# Patient Record
Sex: FEMALE | Race: BLACK OR AFRICAN AMERICAN | NOT HISPANIC OR LATINO | Employment: FULL TIME | URBAN - METROPOLITAN AREA
[De-identification: names, ages, dates, MRNs, and addresses within clinical notes are randomized per-mention and may not be internally consistent; named-entity substitution may affect disease eponyms.]

---

## 2020-05-04 ENCOUNTER — TELEPHONE (OUTPATIENT)
Dept: UROGYNECOLOGY | Facility: CLINIC | Age: 25
End: 2020-05-04

## 2020-05-04 NOTE — TELEPHONE ENCOUNTER
LVM for patient to reschedule for later in the afternoon Wed 5/6. She is not eligible for virtual visit, but if afternoon 5/6 does not work, she can reschedule to another day or other provider.

## 2020-05-06 ENCOUNTER — OFFICE VISIT (OUTPATIENT)
Dept: UROGYNECOLOGY | Facility: CLINIC | Age: 25
End: 2020-05-06
Payer: MEDICAID

## 2020-05-06 VITALS
SYSTOLIC BLOOD PRESSURE: 104 MMHG | WEIGHT: 257 LBS | HEIGHT: 65 IN | DIASTOLIC BLOOD PRESSURE: 76 MMHG | BODY MASS INDEX: 42.82 KG/M2

## 2020-05-06 DIAGNOSIS — N86 CERVICAL ECTROPION: ICD-10-CM

## 2020-05-06 DIAGNOSIS — Z23 NEED FOR HPV VACCINE: ICD-10-CM

## 2020-05-06 DIAGNOSIS — N93.0 PCB (POST COITAL BLEEDING): Primary | ICD-10-CM

## 2020-05-06 DIAGNOSIS — Z12.4 PAP SMEAR FOR CERVICAL CANCER SCREENING: ICD-10-CM

## 2020-05-06 PROCEDURE — 87481 CANDIDA DNA AMP PROBE: CPT | Mod: 59

## 2020-05-06 PROCEDURE — 88141 PR  CYTOPATH CERV/VAG INTERPRET: ICD-10-PCS | Mod: ,,, | Performed by: PATHOLOGY

## 2020-05-06 PROCEDURE — 88141 CYTOPATH C/V INTERPRET: CPT | Mod: ,,, | Performed by: PATHOLOGY

## 2020-05-06 PROCEDURE — 99999 PR PBB SHADOW E&M-EST. PATIENT-LVL III: CPT | Mod: PBBFAC,,, | Performed by: PHYSICIAN ASSISTANT

## 2020-05-06 PROCEDURE — 87661 TRICHOMONAS VAGINALIS AMPLIF: CPT

## 2020-05-06 PROCEDURE — 99999 PR PBB SHADOW E&M-EST. PATIENT-LVL III: ICD-10-PCS | Mod: PBBFAC,,, | Performed by: PHYSICIAN ASSISTANT

## 2020-05-06 PROCEDURE — 87491 CHLMYD TRACH DNA AMP PROBE: CPT

## 2020-05-06 PROCEDURE — 87801 DETECT AGNT MULT DNA AMPLI: CPT

## 2020-05-06 PROCEDURE — 99204 PR OFFICE/OUTPT VISIT, NEW, LEVL IV, 45-59 MIN: ICD-10-PCS | Mod: S$PBB,,, | Performed by: PHYSICIAN ASSISTANT

## 2020-05-06 PROCEDURE — 88175 CYTOPATH C/V AUTO FLUID REDO: CPT | Performed by: PATHOLOGY

## 2020-05-06 PROCEDURE — 99204 OFFICE O/P NEW MOD 45 MIN: CPT | Mod: S$PBB,,, | Performed by: PHYSICIAN ASSISTANT

## 2020-05-06 PROCEDURE — 99213 OFFICE O/P EST LOW 20 MIN: CPT | Mod: PBBFAC | Performed by: PHYSICIAN ASSISTANT

## 2020-05-06 RX ORDER — NORGESTIMATE AND ETHINYL ESTRADIOL 7DAYSX3 28
1 KIT ORAL DAILY
Qty: 30 TABLET | Refills: 11 | Status: SHIPPED | OUTPATIENT
Start: 2020-05-06 | End: 2021-05-06

## 2020-05-06 RX ORDER — PHENTERMINE HYDROCHLORIDE 37.5 MG/1
TABLET ORAL
COMMUNITY
Start: 2020-03-23

## 2020-05-06 NOTE — LETTER
May 6, 2020      Janak London MD  [address] [facility] [address]           [facility] UroGynecology-[id number]   [address] [address]  Phone: [phone]          Patient: Ambar Rascon   MR Number: [id number]   YOB: 1995   Date of Visit: 5/6/2020       Dear Dr. Janak London:    Thank you for referring Ambar Rascon to me for evaluation. Attached you will find relevant portions of my assessment and plan of care.    If you have questions, please do not hesitate to call me. I look forward to following Ambar Rascon along with you.    Sincerely,    Michael Teixeira PA-C    Enclosure  CC:  No Recipients    If you would like to receive this communication electronically, please contact [email or web address] or [phone] to request more information on [employer] access.    For providers and/or their staff who would like to refer a patient to [facility], please contact us through our one-stop-shop provider referral line, [facility], at [phone].    If you feel you have received this communication in error or would no longer like to receive these types of communications, please e-mail [email or web address]

## 2020-05-06 NOTE — PROGRESS NOTES
[facility] UROGYNECOLOGY-[id number]   [address] [address]    Ambar Rascon  [id number]  1995  May 6, 2020    Consulting Physician: Janak London MD   GYN: None  PCP: Janak London MD    CC:   Chief Complaint   Patient presents with    abnormal vaginal bleeding       HPI: 23 y/o  healthy AA female presents for abnormal vaginal bleeding x 3 weeks.    Patient says she has been bleeding for 3 weeks. She reports that she started her period on , stopped bleeding . She had intercourse the next day and her partner first noticed bright red blood coming from the vagina. Stopped intercourse. Thought it could have been from period, but in days after she continued bleeding and passing blood clots. It is not enough blood to wear a pad, but sees it usually when she wipes. Also experiencing cramping, back pain, mood swings, occasional upset stomach and more bleeding with bearing down during bowel movements. She is not presently bleeding, last time she saw blood was  when she went to the bathroom and wiped (saw it on her toilet paper). Denies dyspareunia or other vaginal pain, abnormal discharge, rectal pain or bleeding. No history of metrorrhagia, abnormal PAP, bleeding disorder, or connective tissue disorder. Denies trauma. She endorses chlamydia in the past. Was having very regular periods. Uses condoms sometimes for birth control. Is interested in OCP. Has one sexual partner.       Past Medical History  History reviewed. No pertinent past medical history.     Past Surgical History  Past Surgical History:   Procedure Laterality Date    TONSILLECTOMY          Family History  History reviewed. No pertinent family history.   Colon CA: No  Breast CA: No  GYN CA: No   CA: No    Social History  Social History     Tobacco Use   Smoking Status Never Smoker   Smokeless Tobacco Never Used     Social History     Substance and Sexual Activity   Alcohol Use Not on file   .   "  Social History     Substance and Sexual Activity   Drug Use Yes    Types: Marijuana   .  The patient is single. Has sex with men, one partner.   Resides in [place] [address].  Employment status: currently employed  [employer]    Allergies  Review of patient's allergies indicates:  No Known Allergies    Medications  Current Outpatient Medications on File Prior to Visit   Medication Sig Dispense Refill    phentermine (ADIPEX-P) 37.5 mg tablet        No current facility-administered medications on file prior to visit.        OB History    Para Term  AB Living   1         1   SAB TAB Ectopic Multiple Live Births                  # Outcome Date GA Lbr Jeffery/2nd Weight Sex Delivery Anes PTL Lv   1                 Past OB History  No obstetric history on file.   x 1.    Largest infant weight: 7 lb 13oz  no FAVD.   no episiotomy.      Gynecologic History  LMP: Patient's last menstrual period was 2020.  Method of contraception: sometimes condoms  Age of menarche: 14  Menstrual history: regular, heavy, changes pads every 2 hours, lasts for 5 days    Well Woman  Last pap:   Last mammogram: none  Colonoscopy: none  DEXA: none    Review of Systems A 14 point ROS was reviewed with pertinent positives as noted above in the history of present illness.      Constitutional: negative  Eyes: negative  Endocrine: negative  Gastrointestinal: negative  Cardiovascular: negative  Respiratory: negative  Allergic/Immunologic: negative  Integumentary: negative  Psychiatric: negative  Musculoskeletal: negative   Ear/Nose/Throat: negative  Neurologic: negative  Genitourinary: SEE HPI  Hematologic/Lymphatic: negative   Breast: negative    Urogynecologic Exam  /76 (BP Location: Right arm, Patient Position: Sitting)   Ht 5' 5" (1.651 m)   Wt 116.6 kg (257 lb)   LMP 2020   BMI 42.77 kg/m²     GENERAL APPEARANCE:  The patient is a well-developed, well-nourished overweight AA " female.  Abdomen:  Soft, nontender, nondistended, no hepatosplenomegaly.  Incisions:  None    PELVIC:    External genitalia:  Normal Bartholins, Skenes and labia bilaterally.    Urethra:  No caruncle, diverticulum or masses.  (--) hypermobility.    Vagina:  Atrophy (--) , no bladder masses or tender, no discharge.    Cervix:  thin prep PAP obtained, presence of blood from cervical os and ectropian, friable cervix  Uterus: normal size, contour, position, consistency, mobility, non-tender  Adnexa: Not palpable.    POP-Q: Deferred.  No obvious POP present with valsalva.     NEUROLOGIC:  Cranial nerves 2 through 12 intact.  Strength 5/5.  DTRs 2+ lower extremities.  S2 through 4 normal.  Sacral reflexes intact.    EXT: TORRES, 2+ pulses bilaterally, no C/C/E    RECTAL:    External:  Normal, (--) hemorrhoids, (--) dovetailing.   Internal:  deferred      Impression  1. PCB (post coital bleeding)        Initial Plan  The patient was counseled regarding these issues. She was given a summary sheet containing each of these issues with possible options for evaluation and management. We also reviewed computer-generated diagrams specific to her quantification findings and she was given a copy of this for her records.  All her questions were addressed to her satisfaction.    Cervical ectropion  -- Post-coital bleeding most likely physiologic due to cervical ectropion.   -- Cervical ectopy is when the soft cells (glandular cells) that line the inside of the cervical canal spread to the outer surface of your cervix. You may have been born with cervical ectropion. Or you may develop it later in life, most likely when your hormone levels change and estrogen levels go up, such as during puberty, pregnancy, or when you take birth control pills.  -- Pregnancy test negative  -- Rule out infectious cervicitis. Tested for G/C and trichomonas.   -- PAP done today, will result in 2-3 weeks.   -- No signs of rectal bleeding  --Encouraged patient  to refrain from vaginal intercourse for a week or two    Contraception  -- occasional condom use presently  -- would like to start oral birth control at this time  -- warned that Adipex can decrease effectiveness of oral birth control  -- consider IUD after resolution of cervical bleeding    HPV vaccine  -- [facility]    RTC in 1 month if bleeding has not resolved    Approximately 25 min were spent in consult, 80 % in discussion.     Thank you for requesting consultation of your patient.  I look forward to participating in her care.    Michael Teixeira PA-C  Division of Female Pelvic Medicine and Reconstructive Surgery  Department of Obstetrics and Gynecology  [facility] [place], [place]

## 2020-05-06 NOTE — PATIENT INSTRUCTIONS
Cervical ectropion  -- Post-coital bleeding most likely physiologic due to cervical ectropion.   -- Cervical ectopy is when the soft cells (glandular cells) that line the inside of the cervical canal spread to the outer surface of your cervix. You may have been born with cervical ectropion. Or you may develop it later in life, most likely when your hormone levels change and estrogen levels go up, such as during puberty, pregnancy, or when you take birth control pills.  -- Pregnancy test negative  -- Rule out infectious cervicitis. Tested for G/C and trichomonas.   -- PAP done today, will result in 2-3 weeks.   -- No signs of rectal bleeding  --Encouraged patient to refrain from vaginal intercourse for a week or two    Contraception  -- occasional condom use   -- would like to start oral birth control at this time  -- warned that Adipex can decrease effectiveness of oral birth control    HPV vaccine  -- referred to injection nurse at [facility] for vaccine    RTC in 1 month if bleeding has not resolved

## 2020-05-08 ENCOUNTER — TELEPHONE (OUTPATIENT)
Dept: UROGYNECOLOGY | Facility: CLINIC | Age: 25
End: 2020-05-08

## 2020-05-08 LAB
BACTERIAL VAGINOSIS DNA: NEGATIVE
C TRACH DNA SPEC QL NAA+PROBE: DETECTED
CANDIDA GLABRATA DNA: NEGATIVE
CANDIDA KRUSEI DNA: NEGATIVE
CANDIDA RRNA VAG QL PROBE: NEGATIVE
N GONORRHOEA DNA SPEC QL NAA+PROBE: NOT DETECTED
T VAGINALIS RRNA GENITAL QL PROBE: NEGATIVE

## 2020-05-08 RX ORDER — AZITHROMYCIN DIHYDRATE 500 MG/1
1000 TABLET, FILM COATED ORAL DAILY
Qty: 2 TABLET | Refills: 0 | Status: SHIPPED | OUTPATIENT
Start: 2020-05-08 | End: 2020-05-09

## 2020-05-08 NOTE — TELEPHONE ENCOUNTER
LVM x 2 for patient regarding lab results and that patient is to take medication prescribed to pharmacy. Did not disclose results over VM.     Will call back Monday to let her know that Isaías is + and her partner also needs to be treated. She is to refrain from sexual activity for 1 week after treatment of her and her partner.     Will also discuss further testing regarding vagina bleeding and concomitant STIs.

## 2020-05-11 ENCOUNTER — TELEPHONE (OUTPATIENT)
Dept: UROGYNECOLOGY | Facility: CLINIC | Age: 25
End: 2020-05-11

## 2020-05-11 DIAGNOSIS — N93.9 ABNORMAL UTERINE BLEEDING (AUB): Primary | ICD-10-CM

## 2020-05-11 NOTE — TELEPHONE ENCOUNTER
Spoke to patient about +chlamydia. She is aware and has picked up/taken medication. Aware that her partner needs to be tested/treated. She has started bleeding again and cramping, but it is 29 days since she last started her period so the thinks it is her regular cycle. She is interested in testing for HIV and syphillis. Her last chlamydia infection was 4+ years ago, but she would like to r/o other problems. As well as getting a pelvic US to rule out anything more serious since she bled almost the entire month. Will also test TSH.     Also, she will not start taking OCPs until test results come back.

## 2020-05-27 ENCOUNTER — OFFICE VISIT (OUTPATIENT)
Dept: UROGYNECOLOGY | Facility: CLINIC | Age: 25
End: 2020-05-27
Payer: MEDICAID

## 2020-05-27 ENCOUNTER — HOSPITAL ENCOUNTER (OUTPATIENT)
Dept: RADIOLOGY | Facility: OTHER | Age: 25
Discharge: HOME | End: 2020-05-27
Attending: PHYSICIAN ASSISTANT
Payer: MEDICAID

## 2020-05-27 VITALS
WEIGHT: 257.5 LBS | SYSTOLIC BLOOD PRESSURE: 110 MMHG | BODY MASS INDEX: 42.9 KG/M2 | DIASTOLIC BLOOD PRESSURE: 80 MMHG | HEIGHT: 65 IN

## 2020-05-27 DIAGNOSIS — R35.0 URINARY FREQUENCY: Primary | ICD-10-CM

## 2020-05-27 DIAGNOSIS — N93.9 ABNORMAL UTERINE BLEEDING (AUB): ICD-10-CM

## 2020-05-27 PROCEDURE — 76830 US PELVIS COMP WITH TRANSVAG NON-OB (XPD): ICD-10-PCS | Mod: 26,,, | Performed by: INTERNAL MEDICINE

## 2020-05-27 PROCEDURE — 99213 OFFICE O/P EST LOW 20 MIN: CPT | Mod: PBBFAC,25 | Performed by: PHYSICIAN ASSISTANT

## 2020-05-27 PROCEDURE — 99999 PR PBB SHADOW E&M-EST. PATIENT-LVL III: CPT | Mod: PBBFAC,,, | Performed by: PHYSICIAN ASSISTANT

## 2020-05-27 PROCEDURE — 76830 TRANSVAGINAL US NON-OB: CPT | Mod: TC

## 2020-05-27 PROCEDURE — 99212 OFFICE O/P EST SF 10 MIN: CPT | Mod: S$PBB,,, | Performed by: PHYSICIAN ASSISTANT

## 2020-05-27 PROCEDURE — 99212 PR OFFICE/OUTPT VISIT, EST, LEVL II, 10-19 MIN: ICD-10-PCS | Mod: S$PBB,,, | Performed by: PHYSICIAN ASSISTANT

## 2020-05-27 PROCEDURE — 76856 US EXAM PELVIC COMPLETE: CPT | Mod: 26,,, | Performed by: INTERNAL MEDICINE

## 2020-05-27 PROCEDURE — 76856 US PELVIS COMP WITH TRANSVAG NON-OB (XPD): ICD-10-PCS | Mod: 26,,, | Performed by: INTERNAL MEDICINE

## 2020-05-27 PROCEDURE — 99999 PR PBB SHADOW E&M-EST. PATIENT-LVL III: ICD-10-PCS | Mod: PBBFAC,,, | Performed by: PHYSICIAN ASSISTANT

## 2020-05-27 PROCEDURE — 76830 TRANSVAGINAL US NON-OB: CPT | Mod: 26,,, | Performed by: INTERNAL MEDICINE

## 2020-05-27 NOTE — PATIENT INSTRUCTIONS
Post coital bleeding  -- Resolved  -- Pregnancy test negative  -- + chlamyida, completed medication therapy and told previous partner to get treated   -- PAP insufficient, likely too much bleeding at last exam. Will do another pap before IUD implantation.  --TSH WNL  --awaiting RPR and HIV results     Contraception  -- occasional condom use presently  -- Instead of OCP with Adipex, would like Mirena  -- will order Mirena to be implanted in a month  -- Patient must use condoms 100% of the time before IUD implantation     HPV vaccine  -- scheduled with injection nurse at [facility] [address] Wed Deng 3 at 2:45    RTC in 1 month or when IUD comes in for implantation and PAP

## 2020-05-27 NOTE — PROGRESS NOTES
[facility] UROGYNECOLOGY-[id number]   [address] [address]  2020     Ambar Rascon  [id number]  1995    UROGYN FOLLOW-UP  2020     25 y.o. female,   presents for urogyn follow up. She c/o   Chief Complaint   Patient presents with    aub       Last HPI from 2020:  HPI: 23 y/o  healthy AA female presents for abnormal vaginal bleeding x 3 weeks.     Patient says she has been bleeding for 3 weeks. She reports that she started her period on , stopped bleeding . She had intercourse the next day and her partner first noticed bright red blood coming from the vagina. Stopped intercourse. Thought it could have been from period, but in days after she continued bleeding and passing blood clots. It is not enough blood to wear a pad, but sees it usually when she wipes. Also experiencing cramping, back pain, mood swings, occasional upset stomach and more bleeding with bearing down during bowel movements. She is not presently bleeding, last time she saw blood was  when she went to the bathroom and wiped (saw it on her toilet paper). Denies dyspareunia or other vaginal pain, abnormal discharge, rectal pain or bleeding. No history of metrorrhagia, abnormal PAP, bleeding disorder, or connective tissue disorder. Denies trauma. She endorses chlamydia in the past. Was having very regular periods. Uses condoms sometimes for birth control. Is interested in OCP. Has one sexual partner.     Changes from last visit:  No more bleeding, resumed intercourse without bleeding  Wants IUD to be ordered will scheduled with injections nurse    No past medical history on file.    Past Surgical History:   Procedure Laterality Date    TONSILLECTOMY         No family history on file.    Social History     Socioeconomic History    Marital status: Single     Spouse name: Not on file    Number of children: Not on file    Years of education: Not on file    Highest education  level: Not on file   Occupational History    Not on file   Social Needs    Financial resource strain: Not on file    Food insecurity:     Worry: Not on file     Inability: Not on file    Transportation needs:     Medical: Not on file     Non-medical: Not on file   Tobacco Use    Smoking status: Never Smoker    Smokeless tobacco: Never Used   Substance and Sexual Activity    Alcohol use: Not on file    Drug use: Yes     Types: Marijuana    Sexual activity: Not on file   Lifestyle    Physical activity:     Days per week: Not on file     Minutes per session: Not on file    Stress: Not on file   Relationships    Social connections:     Talks on phone: Not on file     Gets together: Not on file     Attends Sabianism service: Not on file     Active member of club or organization: Not on file     Attends meetings of clubs or organizations: Not on file     Relationship status: Not on file   Other Topics Concern    Not on file   Social History Narrative    Not on file       Current Outpatient Medications   Medication Sig Dispense Refill    phentermine (ADIPEX-P) 37.5 mg tablet       norgestimate-ethinyl estradioL (ORTHO TRI-CYCLEN,TRI-SPRINTEC) 0.18/0.215/0.25 mg-35 mcg (28) tablet Take 1 tablet by mouth once daily. (Patient not taking: Reported on 2020) 30 tablet 11     Current Facility-Administered Medications   Medication Dose Route Frequency Provider Last Rate Last Dose    levonorgestreL 20 mcg/24 hours (5 yrs) 52 mg IUD 1 Intra Uterine Device  1 Intra Uterine Device Intrauterine 1 time in Clinic/HOD Michael Teixeira PA-C           Review of patient's allergies indicates:  No Known Allergies    OB History        1    Para        Term                AB        Living   1       SAB        TAB        Ectopic        Multiple        Live Births                      Well Woman  No LMP recorded.   Pap: 20 unsatisfactory, will repeat  Mammo: none  Colonoscopy: none  Dexa: none    ROS  As per  "HPI.      Physical Exam  /80 (BP Location: Right arm, Patient Position: Sitting, BP Method: Large (Manual))   Ht 5' 5" (1.651 m)   Wt 116.8 kg (257 lb 8 oz)   BMI 42.85 kg/m²   General: alert and oriented, no acute distress  Respiratory: normal respiratory effort  Abd: soft, non-tender, non-distended  Pelvic:  Ext. Genitalia: normal external genitalia. Normal bartholin's and skeens glands  Vagina: - atrophy. Normal vaginal mucosa without lesions. No discharge noted.   Non-tender bladder base without palpable mass.  Cervix: no cervical motion tenderness and no lesions  Uterus:  uterus is normal size, shape, consistency and nontender   Urethra: no masses or tenderness  Urethral meatus: no lesions, caruncle or prolapse.    POP-Q:  Deferred, no obvious prolapse    05/27/20 Pelvic US  Impression     No uterine/endometrial abnormality to correlate with the given clinical history.  Small complex focus within the left ovary, likely hemorrhagic follicle     05/27/20 LABS  TSH - normal  RPR - neg  HIV - neg    Impression  1. Urinary frequency  POCT urine pregnancy     We reviewed the above issues and discussed options for short-term versus long-term management of her problems.     Plan:     Post coital bleeding  -- Resolved  -- Pregnancy test negative  -- + chlamyida, completed medication therapy and told previous partner to get treated   -- PAP insufficient, likely too much bleeding at last exam. Will do another pap before IUD implantation.  --TSH WNL  --awaiting RPR and HIV results     Contraception  -- occasional condom use presently  -- Instead of OCP with Adipex, would like Mirena  -- will order Mirena to be implanted in a month  -- Patient must use condoms 100% of the time before IUD implantation     HPV vaccine  -- scheduled with injection nurse at [facility] [address] Wed Deng 3 at 2:45    RTC in 1 month or when IUD comes in for implantation and PAP    30 minutes were spent in face to face time with this " patient  90 % of this time was spent in counseling and/or coordination of care    Michael Teixeira PA-C  Division of Female Pelvic Medicine and Reconstructive Surgery  Department of Obstetrics & Gynecology  [facility] [place], [place]

## 2020-06-03 ENCOUNTER — CLINICAL SUPPORT (OUTPATIENT)
Dept: OBSTETRICS AND GYNECOLOGY | Facility: CLINIC | Age: 25
End: 2020-06-03
Payer: MEDICAID

## 2020-06-03 DIAGNOSIS — Z23 NEED FOR HPV VACCINE: ICD-10-CM

## 2020-06-03 PROCEDURE — 99999 PR PBB SHADOW E&M-EST. PATIENT-LVL II: ICD-10-PCS | Mod: PBBFAC,,,

## 2020-06-03 PROCEDURE — 99999 PR PBB SHADOW E&M-EST. PATIENT-LVL II: CPT | Mod: PBBFAC,,,

## 2020-06-03 PROCEDURE — 90471 IMMUNIZATION ADMIN: CPT | Mod: PBBFAC

## 2020-06-03 PROCEDURE — 99212 OFFICE O/P EST SF 10 MIN: CPT | Mod: PBBFAC,25

## 2020-06-03 NOTE — PROGRESS NOTES
Here for Gardasil # 1 injection, without complaint at this time. Reports no pain before or after injection. Advised to wait in lobby 15 minutes after injection and report any adverse reactions. Return to clinic in 1 - 2 MONTHS for next injection.     Site:  LD

## 2020-06-08 ENCOUNTER — TELEPHONE (OUTPATIENT)
Dept: UROGYNECOLOGY | Facility: CLINIC | Age: 25
End: 2020-06-08

## 2020-06-08 DIAGNOSIS — Z30.9 ENCOUNTER FOR CONTRACEPTIVE MANAGEMENT, UNSPECIFIED TYPE: ICD-10-CM

## 2020-06-08 DIAGNOSIS — N93.9 ABNORMAL UTERINE BLEEDING (AUB): Primary | ICD-10-CM

## 2020-06-08 NOTE — TELEPHONE ENCOUNTER
Patient is ready to order Mirena. Will call when insurance approves to schedule implantation date.

## 2020-07-01 ENCOUNTER — TELEPHONE (OUTPATIENT)
Dept: UROGYNECOLOGY | Facility: CLINIC | Age: 25
End: 2020-07-01

## 2020-07-01 NOTE — TELEPHONE ENCOUNTER
----- Message from Jesse Saravia sent at 7/1/2020  2:11 PM CDT -----  Pt had a appt today  3:30 she needs to reschedule [phone]

## 2020-07-01 NOTE — TELEPHONE ENCOUNTER
----- Message from Jesse Saravia sent at 7/1/2020  3:38 PM CDT -----  Please call pt to schedule her appt [phone]

## 2020-07-01 NOTE — TELEPHONE ENCOUNTER
Attempted to return pt's call regarding rescheduling her appointment, pt did not answer. Lm to call office

## 2020-07-22 ENCOUNTER — PROCEDURE VISIT (OUTPATIENT)
Dept: UROGYNECOLOGY | Facility: CLINIC | Age: 25
End: 2020-07-22
Payer: MEDICAID

## 2020-07-22 VITALS
DIASTOLIC BLOOD PRESSURE: 76 MMHG | SYSTOLIC BLOOD PRESSURE: 112 MMHG | BODY MASS INDEX: 41.54 KG/M2 | HEIGHT: 65 IN | WEIGHT: 249.31 LBS

## 2020-07-22 DIAGNOSIS — Z30.430 ENCOUNTER FOR IUD INSERTION: ICD-10-CM

## 2020-07-22 DIAGNOSIS — Z30.9 ENCOUNTER FOR CONTRACEPTIVE MANAGEMENT, UNSPECIFIED TYPE: Primary | ICD-10-CM

## 2020-07-22 LAB
B-HCG UR QL: NEGATIVE
CTP QC/QA: YES

## 2020-07-22 PROCEDURE — 58300 INSERT INTRAUTERINE DEVICE: CPT | Mod: S$PBB,,, | Performed by: PHYSICIAN ASSISTANT

## 2020-07-22 PROCEDURE — 58300 INSERT INTRAUTERINE DEVICE: CPT | Mod: PBBFAC | Performed by: PHYSICIAN ASSISTANT

## 2020-07-22 PROCEDURE — 58300 PR INSERT INTRAUTERINE DEVICE: ICD-10-PCS | Mod: S$PBB,,, | Performed by: PHYSICIAN ASSISTANT

## 2020-07-22 RX ORDER — IBUPROFEN 600 MG/1
600 TABLET ORAL EVERY 8 HOURS PRN
Qty: 10 TABLET | Refills: 0 | Status: SHIPPED | OUTPATIENT
Start: 2020-07-22

## 2020-07-22 RX ADMIN — LEVONORGESTREL 1 INTRA UTERINE DEVICE: 52 INTRAUTERINE DEVICE INTRAUTERINE at 04:07

## 2020-07-22 NOTE — PROCEDURES
Procedures    PROCEDURE:  Placement of IUD (Mirena)    The patient was consented for the procedure.  All risks/benefits/alternatives were reviewed (see consent). The patient was placed in dorsal lithotomy position.  A sterile speculum was used to identify the cervix. The ectocervix was prepped x 3 with betadine.  A single-tooth tenaculum was used to grasp the posterior cervix.  A uterine sound was advanced into the uterine cavity until gentle resistance was met, at approximately 8 cm.  The IUD device was loaded back into the cannula, and the guard was set to 8 cm. The strings were unlocked from around the handle.  The cannula was inserted into the cervix until the guard was flush with the external os.  The IUD was then deployed using standard technique, and the cannula was removed.  The tenaculum was removed from the cervix, and excellent hemostasis was noted.  The strings were trimmed to about 3 cm.  The patient tolerated the procedure well.

## 2020-07-22 NOTE — PATIENT INSTRUCTIONS
POST- INSERTION COUNSELING -- Return to clinic if you develop fever, worsening pelvic pain, abdominal pain, syncope (passing out), unusually heavy vaginal bleeding, suspected expulsion, foul smelling vaginal discharge, or pregnancy-like symptoms.      Most common side effects and problems related to IUD insertion include bleeding and cramping. You can take tylenol, ibuprofen or naproxen for the cramping.   ?Back-up contraception - Whether or not they were using a prior contraceptive, women who have a levonorgestrel-releasing IUD inserted are advised to use another method for back-up contraception for 7 days following device placement.  ?Self-IUD string check - We  women that routine self-IUD string checks are safe, but not required, as there are no data to support this practice. Familiarize yourself with how the strings feel by a digital self-examination to reassure you that she you feel the strings at her cervix in the future. If you cannot feel the string, you should use a back-up method of contraception until you can be examined by your clinician to confirm whether the IUD is in place.

## 2021-01-12 ENCOUNTER — CLINICAL SUPPORT (OUTPATIENT)
Dept: OBSTETRICS AND GYNECOLOGY | Facility: CLINIC | Age: 26
End: 2021-01-12
Payer: MEDICAID

## 2021-01-12 ENCOUNTER — LAB VISIT (OUTPATIENT)
Dept: LAB | Facility: OTHER | Age: 26
End: 2021-01-12
Payer: MEDICAID

## 2021-01-12 ENCOUNTER — OFFICE VISIT (OUTPATIENT)
Dept: OBSTETRICS AND GYNECOLOGY | Facility: CLINIC | Age: 26
End: 2021-01-12
Payer: MEDICAID

## 2021-01-12 VITALS
BODY MASS INDEX: 45.14 KG/M2 | SYSTOLIC BLOOD PRESSURE: 114 MMHG | DIASTOLIC BLOOD PRESSURE: 74 MMHG | HEIGHT: 65 IN | WEIGHT: 270.94 LBS

## 2021-01-12 DIAGNOSIS — Z97.5 IUD (INTRAUTERINE DEVICE) IN PLACE: ICD-10-CM

## 2021-01-12 DIAGNOSIS — Z72.51 UNPROTECTED SEXUAL INTERCOURSE: ICD-10-CM

## 2021-01-12 DIAGNOSIS — Z11.3 SCREEN FOR STD (SEXUALLY TRANSMITTED DISEASE): ICD-10-CM

## 2021-01-12 DIAGNOSIS — Z11.3 SCREEN FOR STD (SEXUALLY TRANSMITTED DISEASE): Primary | ICD-10-CM

## 2021-01-12 DIAGNOSIS — Z72.51 UNPROTECTED SEXUAL INTERCOURSE: Primary | ICD-10-CM

## 2021-01-12 LAB — RPR SER QL: NORMAL

## 2021-01-12 PROCEDURE — 86592 SYPHILIS TEST NON-TREP QUAL: CPT

## 2021-01-12 PROCEDURE — 99213 OFFICE O/P EST LOW 20 MIN: CPT | Mod: PBBFAC | Performed by: PHYSICIAN ASSISTANT

## 2021-01-12 PROCEDURE — 36415 COLL VENOUS BLD VENIPUNCTURE: CPT

## 2021-01-12 PROCEDURE — 99999 PR PBB SHADOW E&M-EST. PATIENT-LVL III: CPT | Mod: PBBFAC,,, | Performed by: PHYSICIAN ASSISTANT

## 2021-01-12 PROCEDURE — 99214 OFFICE O/P EST MOD 30 MIN: CPT | Mod: S$PBB,,, | Performed by: PHYSICIAN ASSISTANT

## 2021-01-12 PROCEDURE — 99999 PR PBB SHADOW E&M-EST. PATIENT-LVL I: ICD-10-PCS | Mod: PBBFAC,,,

## 2021-01-12 PROCEDURE — 99999 PR PBB SHADOW E&M-EST. PATIENT-LVL I: CPT | Mod: PBBFAC,,,

## 2021-01-12 PROCEDURE — 87660 TRICHOMONAS VAGIN DIR PROBE: CPT

## 2021-01-12 PROCEDURE — 99999 PR PBB SHADOW E&M-EST. PATIENT-LVL III: ICD-10-PCS | Mod: PBBFAC,,, | Performed by: PHYSICIAN ASSISTANT

## 2021-01-12 PROCEDURE — 87510 GARDNER VAG DNA DIR PROBE: CPT

## 2021-01-12 PROCEDURE — 87340 HEPATITIS B SURFACE AG IA: CPT

## 2021-01-12 PROCEDURE — 86803 HEPATITIS C AB TEST: CPT

## 2021-01-12 PROCEDURE — 86703 HIV-1/HIV-2 1 RESULT ANTBDY: CPT

## 2021-01-12 PROCEDURE — 96372 THER/PROPH/DIAG INJ SC/IM: CPT | Mod: PBBFAC

## 2021-01-12 PROCEDURE — 99214 PR OFFICE/OUTPT VISIT, EST, LEVL IV, 30-39 MIN: ICD-10-PCS | Mod: S$PBB,,, | Performed by: PHYSICIAN ASSISTANT

## 2021-01-12 RX ORDER — AZITHROMYCIN 500 MG/1
1000 TABLET, FILM COATED ORAL DAILY
Qty: 2 TABLET | Refills: 0 | Status: SHIPPED | OUTPATIENT
Start: 2021-01-12 | End: 2021-01-13

## 2021-01-12 RX ORDER — CEFTRIAXONE 250 MG/1
250 INJECTION, POWDER, FOR SOLUTION INTRAMUSCULAR; INTRAVENOUS ONCE
Qty: 1 VIAL | Refills: 0 | Status: SHIPPED | OUTPATIENT
Start: 2021-01-12 | End: 2021-01-13

## 2021-01-12 RX ORDER — CEFTRIAXONE 250 MG/1
250 INJECTION, POWDER, FOR SOLUTION INTRAMUSCULAR; INTRAVENOUS ONCE
Status: COMPLETED | OUTPATIENT
Start: 2021-01-12 | End: 2021-01-12

## 2021-01-12 RX ADMIN — CEFTRIAXONE 250 MG: 250 INJECTION, POWDER, FOR SOLUTION INTRAMUSCULAR; INTRAVENOUS at 09:01

## 2021-01-13 ENCOUNTER — PATIENT MESSAGE (OUTPATIENT)
Dept: OBSTETRICS AND GYNECOLOGY | Facility: CLINIC | Age: 26
End: 2021-01-13

## 2021-01-13 LAB
CANDIDA RRNA VAG QL PROBE: NEGATIVE
G VAGINALIS RRNA GENITAL QL PROBE: POSITIVE
HBV SURFACE AG SERPL QL IA: NEGATIVE
HCV AB SERPL QL IA: NEGATIVE
HIV 1+2 AB+HIV1 P24 AG SERPL QL IA: NEGATIVE
T VAGINALIS RRNA GENITAL QL PROBE: NEGATIVE

## 2021-01-13 RX ORDER — METRONIDAZOLE 500 MG/1
500 TABLET ORAL 2 TIMES DAILY
Qty: 14 TABLET | Refills: 0 | Status: SHIPPED | OUTPATIENT
Start: 2021-01-13 | End: 2021-01-20

## 2021-06-23 DIAGNOSIS — E66.9 OBESITY: ICD-10-CM

## 2021-06-23 DIAGNOSIS — E88.9 METABOLIC DISORDER: Primary | ICD-10-CM

## 2021-07-07 ENCOUNTER — PROCEDURE VISIT (OUTPATIENT)
Dept: OBSTETRICS AND GYNECOLOGY | Facility: CLINIC | Age: 26
End: 2021-07-07
Payer: MEDICAID

## 2021-07-07 ENCOUNTER — OFFICE VISIT (OUTPATIENT)
Dept: OBSTETRICS AND GYNECOLOGY | Facility: CLINIC | Age: 26
End: 2021-07-07
Payer: MEDICAID

## 2021-07-07 VITALS — BODY MASS INDEX: 45.6 KG/M2 | DIASTOLIC BLOOD PRESSURE: 82 MMHG | SYSTOLIC BLOOD PRESSURE: 132 MMHG | WEIGHT: 274.06 LBS

## 2021-07-07 DIAGNOSIS — Z11.3 SCREENING EXAMINATION FOR STD (SEXUALLY TRANSMITTED DISEASE): ICD-10-CM

## 2021-07-07 DIAGNOSIS — Z11.3 SCREENING EXAMINATION FOR STD (SEXUALLY TRANSMITTED DISEASE): Primary | ICD-10-CM

## 2021-07-07 PROCEDURE — 87340 HEPATITIS B SURFACE AG IA: CPT | Performed by: OBSTETRICS & GYNECOLOGY

## 2021-07-07 PROCEDURE — 87389 HIV-1 AG W/HIV-1&-2 AB AG IA: CPT | Performed by: OBSTETRICS & GYNECOLOGY

## 2021-07-07 PROCEDURE — 87591 N.GONORRHOEAE DNA AMP PROB: CPT | Performed by: OBSTETRICS & GYNECOLOGY

## 2021-07-07 PROCEDURE — 87491 CHLMYD TRACH DNA AMP PROBE: CPT | Performed by: OBSTETRICS & GYNECOLOGY

## 2021-07-07 PROCEDURE — 86592 SYPHILIS TEST NON-TREP QUAL: CPT | Performed by: OBSTETRICS & GYNECOLOGY

## 2021-07-07 PROCEDURE — 87481 CANDIDA DNA AMP PROBE: CPT | Mod: 59 | Performed by: OBSTETRICS & GYNECOLOGY

## 2021-07-07 PROCEDURE — 99999 PR PBB SHADOW E&M-EST. PATIENT-LVL III: ICD-10-PCS | Mod: PBBFAC,,, | Performed by: OBSTETRICS & GYNECOLOGY

## 2021-07-07 PROCEDURE — 99213 PR OFFICE/OUTPT VISIT, EST, LEVL III, 20-29 MIN: ICD-10-PCS | Mod: S$PBB,,, | Performed by: OBSTETRICS & GYNECOLOGY

## 2021-07-07 PROCEDURE — 99213 OFFICE O/P EST LOW 20 MIN: CPT | Mod: S$PBB,,, | Performed by: OBSTETRICS & GYNECOLOGY

## 2021-07-07 PROCEDURE — 99999 PR PBB SHADOW E&M-EST. PATIENT-LVL III: CPT | Mod: PBBFAC,,, | Performed by: OBSTETRICS & GYNECOLOGY

## 2021-07-07 PROCEDURE — 99213 OFFICE O/P EST LOW 20 MIN: CPT | Mod: PBBFAC,PN | Performed by: OBSTETRICS & GYNECOLOGY

## 2021-07-07 RX ORDER — CITALOPRAM 10 MG/1
10 TABLET ORAL DAILY
COMMUNITY
Start: 2021-06-09

## 2021-07-08 LAB
HBV SURFACE AG SERPL QL IA: NEGATIVE
HIV 1+2 AB+HIV1 P24 AG SERPL QL IA: NEGATIVE
RPR SER QL: NORMAL

## 2021-07-09 LAB
C TRACH DNA SPEC QL NAA+PROBE: NOT DETECTED
N GONORRHOEA DNA SPEC QL NAA+PROBE: NOT DETECTED

## 2021-07-12 ENCOUNTER — PATIENT MESSAGE (OUTPATIENT)
Dept: OBSTETRICS AND GYNECOLOGY | Facility: CLINIC | Age: 26
End: 2021-07-12

## 2021-07-12 DIAGNOSIS — B96.89 BV (BACTERIAL VAGINOSIS): Primary | ICD-10-CM

## 2021-07-12 DIAGNOSIS — N76.0 BV (BACTERIAL VAGINOSIS): Primary | ICD-10-CM

## 2021-07-12 LAB
BACTERIAL VAGINOSIS DNA: POSITIVE
CANDIDA GLABRATA DNA: NEGATIVE
CANDIDA KRUSEI DNA: NEGATIVE
CANDIDA RRNA VAG QL PROBE: NEGATIVE
T VAGINALIS RRNA GENITAL QL PROBE: POSITIVE

## 2021-07-12 RX ORDER — METRONIDAZOLE 500 MG/1
500 TABLET ORAL EVERY 12 HOURS
Qty: 14 TABLET | Refills: 0 | Status: SHIPPED | OUTPATIENT
Start: 2021-07-12 | End: 2021-07-19

## 2022-01-04 ENCOUNTER — TELEPHONE (OUTPATIENT)
Dept: UROGYNECOLOGY | Facility: CLINIC | Age: 27
End: 2022-01-04
Payer: MEDICAID

## 2022-01-04 NOTE — TELEPHONE ENCOUNTER
----- Message from Erin Christensen sent at 1/4/2022  2:35 PM CST -----  Pt is calling to reschedule her appt 1/5  Pt can be contacted at [phone]

## 2022-01-05 ENCOUNTER — OFFICE VISIT (OUTPATIENT)
Dept: UROGYNECOLOGY | Facility: CLINIC | Age: 27
End: 2022-01-05
Payer: MEDICAID

## 2022-01-05 VITALS
SYSTOLIC BLOOD PRESSURE: 120 MMHG | BODY MASS INDEX: 48.82 KG/M2 | DIASTOLIC BLOOD PRESSURE: 70 MMHG | WEIGHT: 293 LBS | HEIGHT: 65 IN

## 2022-01-05 DIAGNOSIS — Z11.3 SCREEN FOR STD (SEXUALLY TRANSMITTED DISEASE): Primary | ICD-10-CM

## 2022-01-05 DIAGNOSIS — R35.0 URINARY FREQUENCY: ICD-10-CM

## 2022-01-05 DIAGNOSIS — Z12.4 SCREENING FOR CERVICAL CANCER: ICD-10-CM

## 2022-01-05 PROCEDURE — 87801 DETECT AGNT MULT DNA AMPLI: CPT | Performed by: PHYSICIAN ASSISTANT

## 2022-01-05 PROCEDURE — 3074F SYST BP LT 130 MM HG: CPT | Mod: CPTII,,, | Performed by: PHYSICIAN ASSISTANT

## 2022-01-05 PROCEDURE — 99212 OFFICE O/P EST SF 10 MIN: CPT | Mod: S$PBB,,, | Performed by: PHYSICIAN ASSISTANT

## 2022-01-05 PROCEDURE — 99212 PR OFFICE/OUTPT VISIT, EST, LEVL II, 10-19 MIN: ICD-10-PCS | Mod: S$PBB,,, | Performed by: PHYSICIAN ASSISTANT

## 2022-01-05 PROCEDURE — 3078F PR MOST RECENT DIASTOLIC BLOOD PRESSURE < 80 MM HG: ICD-10-PCS | Mod: CPTII,,, | Performed by: PHYSICIAN ASSISTANT

## 2022-01-05 PROCEDURE — 87491 CHLMYD TRACH DNA AMP PROBE: CPT | Mod: 59 | Performed by: PHYSICIAN ASSISTANT

## 2022-01-05 PROCEDURE — 3078F DIAST BP <80 MM HG: CPT | Mod: CPTII,,, | Performed by: PHYSICIAN ASSISTANT

## 2022-01-05 PROCEDURE — 1159F PR MEDICATION LIST DOCUMENTED IN MEDICAL RECORD: ICD-10-PCS | Mod: CPTII,,, | Performed by: PHYSICIAN ASSISTANT

## 2022-01-05 PROCEDURE — 99999 PR PBB SHADOW E&M-EST. PATIENT-LVL III: CPT | Mod: PBBFAC,,, | Performed by: PHYSICIAN ASSISTANT

## 2022-01-05 PROCEDURE — 3008F PR BODY MASS INDEX (BMI) DOCUMENTED: ICD-10-PCS | Mod: CPTII,,, | Performed by: PHYSICIAN ASSISTANT

## 2022-01-05 PROCEDURE — 99999 PR PBB SHADOW E&M-EST. PATIENT-LVL III: ICD-10-PCS | Mod: PBBFAC,,, | Performed by: PHYSICIAN ASSISTANT

## 2022-01-05 PROCEDURE — 88175 CYTOPATH C/V AUTO FLUID REDO: CPT | Performed by: PHYSICIAN ASSISTANT

## 2022-01-05 PROCEDURE — 99213 OFFICE O/P EST LOW 20 MIN: CPT | Mod: PBBFAC | Performed by: PHYSICIAN ASSISTANT

## 2022-01-05 PROCEDURE — 3074F PR MOST RECENT SYSTOLIC BLOOD PRESSURE < 130 MM HG: ICD-10-PCS | Mod: CPTII,,, | Performed by: PHYSICIAN ASSISTANT

## 2022-01-05 PROCEDURE — 87591 N.GONORRHOEAE DNA AMP PROB: CPT | Mod: 59 | Performed by: PHYSICIAN ASSISTANT

## 2022-01-05 PROCEDURE — 3008F BODY MASS INDEX DOCD: CPT | Mod: CPTII,,, | Performed by: PHYSICIAN ASSISTANT

## 2022-01-05 PROCEDURE — 87086 URINE CULTURE/COLONY COUNT: CPT | Performed by: PHYSICIAN ASSISTANT

## 2022-01-05 PROCEDURE — 87481 CANDIDA DNA AMP PROBE: CPT | Mod: 59 | Performed by: PHYSICIAN ASSISTANT

## 2022-01-05 PROCEDURE — 1159F MED LIST DOCD IN RCRD: CPT | Mod: CPTII,,, | Performed by: PHYSICIAN ASSISTANT

## 2022-01-05 NOTE — PROGRESS NOTES
[facility] - UROGYNECOLOGY  [address] [id number]  2022     Ambar Rascon  [id number]  1995    UROGYN FOLLOW-UP  2022     26 y.o. female,   presents for urogyn follow up. She c/o   Chief Complaint   Patient presents with    Follow-up    STD CHECK    .     HPI from 2020:  HPI: 25 y/o  healthy AA female presents for abnormal vaginal bleeding x 3 weeks.     Patient says she has been bleeding for 3 weeks. She reports that she started her period on , stopped bleeding . She had intercourse the next day and her partner first noticed bright red blood coming from the vagina. Stopped intercourse. Thought it could have been from period, but in days after she continued bleeding and passing blood clots. It is not enough blood to wear a pad, but sees it usually when she wipes. Also experiencing cramping, back pain, mood swings, occasional upset stomach and more bleeding with bearing down during bowel movements. She is not presently bleeding, last time she saw blood was  when she went to the bathroom and wiped (saw it on her toilet paper). Denies dyspareunia or other vaginal pain, abnormal discharge, rectal pain or bleeding. No history of metrorrhagia, abnormal PAP, bleeding disorder, or connective tissue disorder. Denies trauma. She endorses chlamydia in the past. Was having very regular periods. Uses condoms sometimes for birth control. Is interested in OCP. Has one sexual partner.      2020:  No more bleeding, resumed intercourse without bleeding  Wants IUD to be ordered will scheduled with injections nurse    Changes from last visit:  Would like STI screening. No abnormal discharge or odor, possibly increased urinary frequency.     History reviewed. No pertinent past medical history.    Past Surgical History:   Procedure Laterality Date    TONSILLECTOMY         Family History   Problem Relation Age of Onset    Hypertension Mother   "      Social History     Socioeconomic History    Marital status: Single   Tobacco Use    Smoking status: Never Smoker    Smokeless tobacco: Never Used   Substance and Sexual Activity    Alcohol use: Yes    Drug use: Yes     Types: Marijuana    Sexual activity: Yes       Current Outpatient Medications   Medication Sig Dispense Refill    citalopram (CELEXA) 10 MG tablet Take 10 mg by mouth once daily.      ibuprofen (ADVIL,MOTRIN) 600 MG tablet Take 1 tablet (600 mg total) by mouth every 8 (eight) hours as needed for Pain. (Patient not taking: No sig reported) 10 tablet 0    norgestimate-ethinyl estradioL (ORTHO TRI-CYCLEN,TRI-SPRINTEC) 0.18/0.215/0.25 mg-35 mcg (28) tablet Take 1 tablet by mouth once daily. 30 tablet 11    phentermine (ADIPEX-P) 37.5 mg tablet        No current facility-administered medications for this visit.       Review of patient's allergies indicates:  No Known Allergies    OB History        1    Para        Term                AB        Living   1       SAB        IAB        Ectopic        Multiple        Live Births                      ROS  As per HPI.      Physical Exam  /70 (BP Location: Left arm, Patient Position: Sitting, BP Method: Large (Manual))   Ht 5' 5" (1.651 m)   Wt (!) 138.4 kg (305 lb 1.9 oz)   BMI 50.77 kg/m²   General: alert and oriented, no acute distress  Respiratory: normal respiratory effort  Abd: soft, non-tender, non-distended  Pelvic:  Ext. Genitalia: normal external genitalia. Normal bartholin's and skeens glands  Vagina: - atrophy. Normal vaginal mucosa without lesions. + discharge noted.   Non-tender bladder base without palpable mass.  Cervix: no bleeding following Pap, no cervical motion tenderness and IUD strings visualized  Uterus:  uterus is normal size, shape, consistency and nontender   Urethra: no masses or tenderness  Urethral meatus: no lesions, caruncle or prolapse.    POP-Q:  none    Impression  1. Screen for STD " (sexually transmitted disease)       We reviewed the above issues and discussed options for short-term versus long-term management of her problems.     Plan:   Well woman/STI screen  -- post coital bleeding resolved  -- POCT UPT today  -- PAP today  --TSH WNL last year  --HIV, RPR negative in 7/2021  -- patient has had chlamydia in the past, re-test today. If positive again: Will need to Tx per PA + advise significant other to get Tx.  No intercourse until both have completed Tx and patient has NEG MARII.   -- IUD in place    Michael Teixeira PA-C  Division of Female Pelvic Medicine and Reconstructive Surgery  Department of Obstetrics & Gynecology  [facility] [place], [place]

## 2022-01-05 NOTE — PATIENT INSTRUCTIONS
Well woman/STI screen  -- post coital bleeding esolved  -- POCT UPT today  -- PAP today  --TSH WNL last year  --HIV, RPR negative in 7/2021  -- patient has had chlamydia in the past, re-test today. If positive again: Will need to Tx per PA + advise significant other to get Tx.  No intercourse until both have completed Tx and patient has NEG MARII.   -- IUD in place

## 2022-01-07 LAB — BACTERIA UR CULT: NO GROWTH

## 2022-01-11 LAB
FINAL PATHOLOGIC DIAGNOSIS: NORMAL
Lab: NORMAL

## 2022-01-13 ENCOUNTER — PATIENT MESSAGE (OUTPATIENT)
Dept: UROGYNECOLOGY | Facility: CLINIC | Age: 27
End: 2022-01-13
Payer: MEDICAID

## 2022-01-13 LAB
BACTERIAL VAGINOSIS DNA: POSITIVE
C TRACH DNA SPEC QL NAA+PROBE: NOT DETECTED
CANDIDA GLABRATA DNA: NEGATIVE
CANDIDA KRUSEI DNA: NEGATIVE
CANDIDA RRNA VAG QL PROBE: NEGATIVE
N GONORRHOEA DNA SPEC QL NAA+PROBE: NOT DETECTED
T VAGINALIS RRNA GENITAL QL PROBE: NEGATIVE

## 2022-01-15 ENCOUNTER — PATIENT MESSAGE (OUTPATIENT)
Dept: UROGYNECOLOGY | Facility: CLINIC | Age: 27
End: 2022-01-15
Payer: MEDICAID

## 2022-01-15 DIAGNOSIS — B96.89 BACTERIAL VAGINOSIS: Primary | ICD-10-CM

## 2022-01-15 DIAGNOSIS — N76.0 BACTERIAL VAGINOSIS: Primary | ICD-10-CM

## 2022-01-18 RX ORDER — TINIDAZOLE 500 MG/1
2 TABLET ORAL
Qty: 8 TABLET | Refills: 0 | Status: SHIPPED | OUTPATIENT
Start: 2022-01-18 | End: 2022-01-20

## 2024-09-11 ENCOUNTER — TELEPHONE (OUTPATIENT)
Dept: UROGYNECOLOGY | Facility: CLINIC | Age: 29
End: 2024-09-11
Payer: MEDICAID